# Patient Record
Sex: MALE | Race: WHITE | NOT HISPANIC OR LATINO | Employment: FULL TIME | URBAN - METROPOLITAN AREA
[De-identification: names, ages, dates, MRNs, and addresses within clinical notes are randomized per-mention and may not be internally consistent; named-entity substitution may affect disease eponyms.]

---

## 2022-10-18 ENCOUNTER — HOSPITAL ENCOUNTER (EMERGENCY)
Facility: CLINIC | Age: 47
Discharge: HOME | End: 2022-10-18
Attending: EMERGENCY MEDICINE | Admitting: EMERGENCY MEDICINE
Payer: OTHER MISCELLANEOUS

## 2022-10-18 ENCOUNTER — APPOINTMENT (OUTPATIENT)
Dept: CT IMAGING | Facility: CLINIC | Age: 47
End: 2022-10-18
Attending: EMERGENCY MEDICINE
Payer: OTHER MISCELLANEOUS

## 2022-10-18 VITALS
TEMPERATURE: 98 F | OXYGEN SATURATION: 98 % | DIASTOLIC BLOOD PRESSURE: 79 MMHG | WEIGHT: 165.1 LBS | RESPIRATION RATE: 16 BRPM | SYSTOLIC BLOOD PRESSURE: 131 MMHG | HEART RATE: 93 BPM

## 2022-10-18 DIAGNOSIS — S20.222A BACK CONTUSION, LEFT, INITIAL ENCOUNTER: ICD-10-CM

## 2022-10-18 PROCEDURE — 250N000011 HC RX IP 250 OP 636: Performed by: EMERGENCY MEDICINE

## 2022-10-18 PROCEDURE — 99282 EMERGENCY DEPT VISIT SF MDM: CPT | Performed by: EMERGENCY MEDICINE

## 2022-10-18 PROCEDURE — 71260 CT THORAX DX C+: CPT

## 2022-10-18 PROCEDURE — 250N000009 HC RX 250: Performed by: EMERGENCY MEDICINE

## 2022-10-18 PROCEDURE — 99285 EMERGENCY DEPT VISIT HI MDM: CPT | Mod: 25 | Performed by: EMERGENCY MEDICINE

## 2022-10-18 RX ORDER — IBUPROFEN 200 MG
800 TABLET ORAL EVERY 8 HOURS PRN
COMMUNITY

## 2022-10-18 RX ORDER — IOPAMIDOL 755 MG/ML
500 INJECTION, SOLUTION INTRAVASCULAR ONCE
Status: COMPLETED | OUTPATIENT
Start: 2022-10-18 | End: 2022-10-18

## 2022-10-18 RX ADMIN — IOPAMIDOL 75 ML: 755 INJECTION, SOLUTION INTRAVENOUS at 12:59

## 2022-10-18 RX ADMIN — SODIUM CHLORIDE 60 ML: 9 INJECTION, SOLUTION INTRAVENOUS at 12:58

## 2022-10-18 ASSESSMENT — ACTIVITIES OF DAILY LIVING (ADL): ADLS_ACUITY_SCORE: 35

## 2022-10-18 NOTE — ED TRIAGE NOTES
Pt presents with workmans comp injury. Pt injured on Friday . Pt states 300 lobs plastic bag cover fell and landed on left shoulder. Pt having left shoulder and scapula pain radiating to left chest.      Triage Assessment     Row Name 10/18/22 1106       Triage Assessment (Adult)    Airway WDL WDL       Respiratory WDL    Respiratory WDL WDL       Skin Circulation/Temperature WDL    Skin Circulation/Temperature WDL WDL       Cardiac WDL    Cardiac WDL WDL       Peripheral/Neurovascular WDL    Peripheral Neurovascular WDL WDL       Cognitive/Neuro/Behavioral WDL    Cognitive/Neuro/Behavioral WDL WDL

## 2022-10-18 NOTE — DISCHARGE INSTRUCTIONS
Please return to the ER if new or worsening symptoms for re-evaluation. I hope you get better quickly.   Your ct looked good  No fractures or organ damage  It was a pleasure to meet you.  This should just get better with time, ibuprofen etc.

## 2022-10-18 NOTE — LETTER
October 18, 2022      To Whom It May Concern:      Uday Guadalupe was seen in our Emergency Department today, 10/18/22.  I expect his condition to improve over the next 7 days.  He may return to work/school when improved.    Sincerely,        Chepe Guerrero MD

## 2022-10-18 NOTE — ED PROVIDER NOTES
History     Chief Complaint   Patient presents with     Back Pain     Workmans comp injury       HPI  Uday Guadalupe is a 47 year old male who presents to the emergency department secondary to left upper back pain that radiates anteriorly to the sternum and to the left neck that started on Friday after heavy object fell onto his back.  He was bending over picking something up and a 2 to 300 pound roll of plastic fell onto his left upper back knocking him to the ground.  No head injury or loss of consciousness.  At first the pain was not too bad because he was wearing a heavy jacket.  Over the course the weekend that has progressively worsened.  He gets a sharp pain into his sternal area with certain movements.  He does not have significant shortness of breath.  No numbness or tingling down the arms.  No lack of range of motion of the arms and the neck.  No tenderness over the midline thoracic or cervical spine.  He has had discectomy of the lumbar spine in the past but no new symptoms in the lower back.  No numbness or tingling.    Allergies:  Allergies   Allergen Reactions     No Known Allergies        Problem List:    There are no problems to display for this patient.       Past Medical History:    No past medical history on file.    Past Surgical History:    No past surgical history on file.    Family History:    No family history on file.    Social History:  Marital Status:  Single [1]        Medications:    ibuprofen (ADVIL/MOTRIN) 200 MG tablet          Review of Systems   All other systems reviewed and are negative.      Physical Exam   BP: (!) 145/92  Pulse: 93  Temp: 98  F (36.7  C)  Resp: 16  Weight: 74.9 kg (165 lb 1.6 oz)  SpO2: 100 %      Physical Exam  Vitals and nursing note reviewed.   Constitutional:       General: He is not in acute distress.     Appearance: He is well-developed and well-nourished. He is not diaphoretic.   HENT:      Head: Normocephalic and atraumatic.      Nose: Nose normal. No  congestion or rhinorrhea.      Mouth/Throat:      Mouth: Mucous membranes are moist.      Pharynx: No oropharyngeal exudate.   Eyes:      General: No scleral icterus.     Extraocular Movements: Extraocular movements intact.      Pupils: Pupils are equal, round, and reactive to light.   Cardiovascular:      Rate and Rhythm: Normal rate and regular rhythm.   Pulmonary:      Effort: Pulmonary effort is normal.      Breath sounds: Normal breath sounds.   Musculoskeletal:         General: Tenderness present. No swelling, deformity or signs of injury. Normal range of motion.      Cervical back: Normal range of motion and neck supple.      Right lower leg: No edema.      Left lower leg: No edema.      Comments: There is some tenderness to palpation over the left upper back just medial and inferior to the scapula.  No scapular tenderness.  Full range of motion of the extremities including overhead reaching with the arms.  No midline cervical spine tenderness.   Skin:     General: Skin is warm and dry.      Findings: No rash.   Neurological:      General: No focal deficit present.      Mental Status: He is alert and oriented to person, place, and time.   Psychiatric:         Mood and Affect: Mood normal.         ED Course                 Procedures                  Results for orders placed or performed during the hospital encounter of 10/18/22 (from the past 24 hour(s))   CT CHEST W CONTRAST    Narrative    CT CHEST WITH CONTRAST 10/18/2022 1:07 PM    CLINICAL HISTORY: Left upper back injury after heavy object fell on  his back. Pain radiates to sternum.    TECHNIQUE: CT chest with IV contrast. Multiplanar reformats were  obtained. Dose reduction techniques were used.    CONTRAST: 75 mL Isovue- 370    COMPARISON: None.    FINDINGS:   LUNGS AND PLEURA: No effusions. No pneumothorax. No acute airspace  disease identified. There are a few small pulmonary nodules  identified. An example in the lingula area is 0.4 cm, series 4  image  211. There are other examples.    MEDIASTINUM/AXILLAE: No acute mediastinal abnormality. No acute  thoracic aortic abnormality. This exam does not evaluate for pulmonary  embolism. No adenopathy or fluid collection.    CORONARY ARTERY CALCIFICATION: None.    UPPER ABDOMEN: No significant finding.    MUSCULOSKELETAL: No acute displaced fracture is identified. Incidental  sebaceous cysts right posterior upper back subcutaneous tissues  measuring 2.3 cm, series 3 image 44.      Impression    IMPRESSION:   1.  No acute traumatic abnormality is seen.  2.  No acute airspace disease.  3.  Incidental sebaceous cyst at the right upper back subcutaneous  tissues.       Medications   iopamidol (ISOVUE-370) solution 500 mL (75 mLs Intravenous Given 10/18/22 1259)   sodium chloride 0.9 % bag 100mL for CT scan flush use (60 mLs Intravenous Given 10/18/22 1258)       Assessments & Plan (with Medical Decision Making)  Upper back injury after fall of a heavy object.  No neurologic symptoms.  The patient does have the pain that radiates into the chest at times and is sharp and therefore CT scan of the chest was performed rather than an x-ray.  CT scan with no acute findings.  Patient was discharged home in stable condition.  I recommended ibuprofen.  This should get better with time.  All questions answered prior to discharge.     I have reviewed the nursing notes.    I have reviewed the findings, diagnosis, plan and need for follow up with the patient.      New Prescriptions    No medications on file       Final diagnoses:   Back contusion, left, initial encounter       10/18/2022   [facility] EMERGENCY DEPT     Chepe Guerrero MD  10/18/22 6048